# Patient Record
Sex: FEMALE | URBAN - NONMETROPOLITAN AREA
[De-identification: names, ages, dates, MRNs, and addresses within clinical notes are randomized per-mention and may not be internally consistent; named-entity substitution may affect disease eponyms.]

---

## 2020-08-31 ENCOUNTER — WEB ENCOUNTER (OUTPATIENT)
Dept: URBAN - NONMETROPOLITAN AREA CLINIC 2 | Facility: CLINIC | Age: 35
End: 2020-08-31

## 2020-09-01 ENCOUNTER — OFFICE VISIT (OUTPATIENT)
Dept: URBAN - NONMETROPOLITAN AREA CLINIC 13 | Facility: CLINIC | Age: 35
End: 2020-09-01
Payer: COMMERCIAL

## 2020-09-01 DIAGNOSIS — B19.10 HEPATITIS B: ICD-10-CM

## 2020-09-01 PROCEDURE — 99244 OFF/OP CNSLTJ NEW/EST MOD 40: CPT | Performed by: INTERNAL MEDICINE

## 2020-09-01 PROCEDURE — 99204 OFFICE O/P NEW MOD 45 MIN: CPT | Performed by: INTERNAL MEDICINE

## 2020-09-01 NOTE — HPI-TODAY'S VISIT:
Ms. Zurita is a new patient here for hepatitis B.  She is referred by her OB/GYN Dr. Ugarte.  She is 17 weeks pregnant.  She states that she did not receive this from her mother.  It was not vertical transmission.  She received it as a child and is always had it.  She is always had high levels.  Labs today show hepatitis B E antigen positive hepatitis B E antibody negative and hepatitis B surface antigen positive.  Her viral load is long 8.  She denies any other symptoms.  She does not think she is ever been treated.

## 2020-09-16 ENCOUNTER — TELEPHONE ENCOUNTER (OUTPATIENT)
Dept: URBAN - METROPOLITAN AREA CLINIC 92 | Facility: CLINIC | Age: 35
End: 2020-09-16

## 2020-09-22 ENCOUNTER — TELEPHONE ENCOUNTER (OUTPATIENT)
Dept: URBAN - METROPOLITAN AREA CLINIC 92 | Facility: CLINIC | Age: 35
End: 2020-09-22

## 2020-09-29 ENCOUNTER — OFFICE VISIT (OUTPATIENT)
Dept: URBAN - NONMETROPOLITAN AREA CLINIC 13 | Facility: CLINIC | Age: 35
End: 2020-09-29
Payer: COMMERCIAL

## 2020-09-29 DIAGNOSIS — B19.10 HEPATITIS B: ICD-10-CM

## 2020-09-29 DIAGNOSIS — R76.8 HEPATITIS B ANTIBODY POSITIVE: ICD-10-CM

## 2020-09-29 PROCEDURE — 99214 OFFICE O/P EST MOD 30 MIN: CPT | Performed by: INTERNAL MEDICINE

## 2020-09-29 NOTE — HPI-TODAY'S VISIT:
Ms. Zurita is a new patient here for hepatitis B.  She is referred by her OB/GYN Dr. Ugarte.  She is 17 weeks pregnant.  She states that she did not receive this from her mother.  It was not vertical transmission.  She received it as a child and is always had it.  She is always had high levels.  Labs today show hepatitis B E antigen positive hepatitis B E antibody negative and hepatitis B surface antigen positive.  Her viral load is long 8.  She denies any other symptoms.  She does not think she is ever been treated. Patient returns today for follow-up. She is doing well. Pregnancy seems to be going well. Labs showed normal liver tests but log eight hepatitis B levels. Ultrasound is normal. She has no questions or concerns today.

## 2020-09-29 NOTE — PHYSICAL EXAM GASTROINTESTINAL
Abdomen , soft, nontender, nondistended , no guarding or rigidity , no masses palpable , normal bowel sounds , Liver and Spleen , no hepatomegaly present  , liver nontender ,

## 2020-09-29 NOTE — PHYSICAL EXAM SKIN:
no rashes , no suspicious lesions , no areas of discoloration , no jaundice present , good turgor , no masses ,

## 2020-09-29 NOTE — PHYSICAL EXAM CONSTITUTIONAL:
well developed, well nourished , in no acute distress , ambulating without difficulty, normal communication ability

## 2020-10-14 ENCOUNTER — TELEPHONE ENCOUNTER (OUTPATIENT)
Dept: URBAN - NONMETROPOLITAN AREA CLINIC 13 | Facility: CLINIC | Age: 35
End: 2020-10-14

## 2020-11-18 ENCOUNTER — WEB ENCOUNTER (OUTPATIENT)
Dept: URBAN - NONMETROPOLITAN AREA CLINIC 13 | Facility: CLINIC | Age: 35
End: 2020-11-18

## 2020-11-18 RX ORDER — TENOFOVIR ALAFENAMIDE 25 MG/1
1 TABLET WITH FOOD TABLET ORAL ONCE A DAY
Qty: 90 | Refills: 3 | OUTPATIENT
Start: 2020-11-19 | End: 2021-11-14

## 2020-12-15 ENCOUNTER — OFFICE VISIT (OUTPATIENT)
Dept: URBAN - NONMETROPOLITAN AREA CLINIC 13 | Facility: CLINIC | Age: 35
End: 2020-12-15

## 2020-12-15 RX ORDER — TENOFOVIR ALAFENAMIDE 25 MG/1
1 TABLET WITH FOOD TABLET ORAL ONCE A DAY
Qty: 90 | Refills: 3 | Status: ACTIVE | COMMUNITY
Start: 2020-11-19 | End: 2021-11-14

## 2021-01-05 ENCOUNTER — OFFICE VISIT (OUTPATIENT)
Dept: URBAN - NONMETROPOLITAN AREA CLINIC 13 | Facility: CLINIC | Age: 36
End: 2021-01-05
Payer: COMMERCIAL

## 2021-01-05 DIAGNOSIS — R76.8 HEPATITIS B ANTIBODY POSITIVE: ICD-10-CM

## 2021-01-05 DIAGNOSIS — B19.10 HEPATITIS B: ICD-10-CM

## 2021-01-05 PROCEDURE — G8420 CALC BMI NORM PARAMETERS: HCPCS | Performed by: INTERNAL MEDICINE

## 2021-01-05 PROCEDURE — G8482 FLU IMMUNIZE ORDER/ADMIN: HCPCS | Performed by: INTERNAL MEDICINE

## 2021-01-05 PROCEDURE — 99214 OFFICE O/P EST MOD 30 MIN: CPT | Performed by: INTERNAL MEDICINE

## 2021-01-05 PROCEDURE — 1036F TOBACCO NON-USER: CPT | Performed by: INTERNAL MEDICINE

## 2021-01-05 RX ORDER — TENOFOVIR ALAFENAMIDE 25 MG/1
1 TABLET WITH FOOD TABLET ORAL ONCE A DAY
Qty: 90 | Refills: 3 | Status: ACTIVE | COMMUNITY
Start: 2020-11-19 | End: 2021-11-14

## 2021-01-05 NOTE — HPI-TODAY'S VISIT:
Ms. Zurita is a new patient here for hepatitis B.  She is referred by her OB/GYN Dr. Ugarte.  She is 17 weeks pregnant.  She states that she did not receive this from her mother.  It was not vertical transmission.  She received it as a child and is always had it.  She is always had high levels.  Labs today show hepatitis B E antigen positive hepatitis B E antibody negative and hepatitis B surface antigen positive.  Her viral load is long 8.  She denies any other symptoms.  She does not think she is ever been treated. Patient returns today for follow-up. She is doing well. Pregnancy seems to be going well. Labs showed normal liver tests but log eight hepatitis B levels. Ultrasound is normal. She has no questions or concerns today. Mrs Hernandez returns for follow-up for hepatitis B in pregnancy.  She had a very difficult time with her insurance company approving Vemlidy ultimately they did approve tenofovir year and she is currently on that.  Unfortunately she started that at 32 weeks.  She seems to be tolerating it well.  She is currently 4 weeks until her due date.  She has not had any side effects of the medication.

## 2021-03-02 ENCOUNTER — OFFICE VISIT (OUTPATIENT)
Dept: URBAN - NONMETROPOLITAN AREA CLINIC 13 | Facility: CLINIC | Age: 36
End: 2021-03-02

## 2021-04-27 ENCOUNTER — OFFICE VISIT (OUTPATIENT)
Dept: URBAN - NONMETROPOLITAN AREA CLINIC 13 | Facility: CLINIC | Age: 36
End: 2021-04-27

## 2021-05-11 ENCOUNTER — OFFICE VISIT (OUTPATIENT)
Dept: URBAN - NONMETROPOLITAN AREA CLINIC 13 | Facility: CLINIC | Age: 36
End: 2021-05-11
Payer: COMMERCIAL

## 2021-05-11 DIAGNOSIS — B19.10 VIRAL HEPATITIS B WITH HEPATITIS DELTA, WITHOUT HEPATIC COMA, UNSPECIFIED CHRONICITY: ICD-10-CM

## 2021-05-11 PROCEDURE — 99214 OFFICE O/P EST MOD 30 MIN: CPT | Performed by: INTERNAL MEDICINE

## 2021-05-11 RX ORDER — TENOFOVIR ALAFENAMIDE 25 MG/1
1 TABLET WITH FOOD TABLET ORAL ONCE A DAY
Qty: 90 | Refills: 3 | Status: ACTIVE | COMMUNITY
Start: 2020-11-19 | End: 2021-11-14

## 2021-05-11 NOTE — HPI-TODAY'S VISIT:
Ms. Zurita is a new patient here for hepatitis B.  She is referred by her OB/GYN Dr. Ugarte.  She is 17 weeks pregnant.  She states that she did not receive this from her mother.  It was not vertical transmission.  She received it as a child and is always had it.  She is always had high levels.  Labs today show hepatitis B E antigen positive hepatitis B E antibody negative and hepatitis B surface antigen positive.  Her viral load is long 8.  She denies any other symptoms.  She does not think she is ever been treated. Patient returns today for follow-up. She is doing well. Pregnancy seems to be going well. Labs showed normal liver tests but log eight hepatitis B levels. Ultrasound is normal. She has no questions or concerns today. Mrs Hernandez returns for follow-up for hepatitis B in pregnancy.  She had a very difficult time with her insurance company approving Vemlidy ultimately they did approve tenofovir year and she is currently on that.  Unfortunately she started that at 32 weeks.  She seems to be tolerating it well.  She is currently 4 weeks until her due date.  She has not had any side effects of the medication.  5/11/2021 Ms. Hernandez returns for follow-up for hepatitis B.  I had been seeing her for a few years with regards to her hepatitis B.  She had active replication and elevated viral load.  I spoke with 2 specific hepatologists in the [place] area regarding her case.  They both agreed for long-term therapy with Vemlidy/tenofovir year.  She then became pregnant it was recommended that she continue medication.  She did continue her medication to her pregnancy but I have not seen her since September.  Since that time it appears that she has decided to come off the medication without discussing this with our office.  She comes today, having read information on the Internet that she needs labs every 6 months and ultrasound imaging regularly.  I explained her that this is what we have been doing routinely given the concern that I have for long-term complications of hepatitis B.  She seems to be very hesitant to restart medication.  She states that she read on the Internet that most people in [place] have hepatitis B and do fine with it.  She denies any jaundice, itching, pruritus, or abdominal pain.

## 2021-05-19 ENCOUNTER — TELEPHONE ENCOUNTER (OUTPATIENT)
Dept: URBAN - NONMETROPOLITAN AREA CLINIC 2 | Facility: CLINIC | Age: 36
End: 2021-05-19

## 2021-06-09 ENCOUNTER — LAB OUTSIDE AN ENCOUNTER (OUTPATIENT)
Dept: URBAN - NONMETROPOLITAN AREA CLINIC 2 | Facility: CLINIC | Age: 36
End: 2021-06-09

## 2021-06-15 LAB
COMMENT: (no result)
HEP B CORE AB, IGM: (no result)
HEP BE AB: (no result)
HEP BE AG: REACTIVE
HEPATITIS B SURFACE AB IMMUNITY, QN: <5
HEPATITIS B SURFACE ANTIBODY QL: (no result)
HEPATITIS B SURFACE ANTIGEN: REACTIVE
QUESTION/PROBLEM:: (no result)
SPECIMEN(S) SUBMITTED:: (no result)
UNCLEAR ORDER:: (no result)

## 2021-06-17 ENCOUNTER — TELEPHONE ENCOUNTER (OUTPATIENT)
Dept: URBAN - METROPOLITAN AREA SURGERY CENTER 30 | Facility: SURGERY CENTER | Age: 36
End: 2021-06-17

## 2021-06-18 LAB
HBSAG SCREEN: POSITIVE
HBV IU/ML: (no result)
HBV IU/ML: (no result)
HEP B CORE AB, IGM: NEGATIVE
HEP B SURFACE AB, QUAL: NON REACTIVE
HEP BE AB: NEGATIVE
HEP BE AG: POSITIVE
LOG10 HBV IU/ML: (no result)
LOG10 HBV IU/ML: 8.64
TEST INFORMATION:: (no result)

## 2021-08-05 ENCOUNTER — TELEPHONE ENCOUNTER (OUTPATIENT)
Dept: URBAN - METROPOLITAN AREA CLINIC 23 | Facility: CLINIC | Age: 36
End: 2021-08-05

## 2021-11-09 ENCOUNTER — OFFICE VISIT (OUTPATIENT)
Dept: URBAN - NONMETROPOLITAN AREA CLINIC 13 | Facility: CLINIC | Age: 36
End: 2021-11-09
Payer: COMMERCIAL

## 2021-11-09 ENCOUNTER — WEB ENCOUNTER (OUTPATIENT)
Dept: URBAN - NONMETROPOLITAN AREA CLINIC 13 | Facility: CLINIC | Age: 36
End: 2021-11-09

## 2021-11-09 DIAGNOSIS — B19.10 HEPATITIS B: ICD-10-CM

## 2021-11-09 PROCEDURE — 99214 OFFICE O/P EST MOD 30 MIN: CPT | Performed by: INTERNAL MEDICINE

## 2021-11-09 RX ORDER — TENOFOVIR ALAFENAMIDE 25 MG/1
1 TABLET WITH FOOD TABLET ORAL ONCE A DAY
Qty: 90 | Refills: 3 | Status: ACTIVE | COMMUNITY
Start: 2020-11-19 | End: 2021-11-14

## 2021-11-15 ENCOUNTER — LAB OUTSIDE AN ENCOUNTER (OUTPATIENT)
Dept: URBAN - NONMETROPOLITAN AREA CLINIC 2 | Facility: CLINIC | Age: 36
End: 2021-11-15

## 2021-11-15 ENCOUNTER — TELEPHONE ENCOUNTER (OUTPATIENT)
Dept: URBAN - NONMETROPOLITAN AREA CLINIC 2 | Facility: CLINIC | Age: 36
End: 2021-11-15

## 2022-03-24 ENCOUNTER — TELEPHONE ENCOUNTER (OUTPATIENT)
Dept: URBAN - NONMETROPOLITAN AREA CLINIC 13 | Facility: CLINIC | Age: 37
End: 2022-03-24

## 2022-05-12 ENCOUNTER — OFFICE VISIT (OUTPATIENT)
Dept: URBAN - NONMETROPOLITAN AREA CLINIC 13 | Facility: CLINIC | Age: 37
End: 2022-05-12

## 2022-07-08 ENCOUNTER — OFFICE VISIT (OUTPATIENT)
Dept: URBAN - NONMETROPOLITAN AREA CLINIC 13 | Facility: CLINIC | Age: 37
End: 2022-07-08
Payer: COMMERCIAL

## 2022-07-08 ENCOUNTER — LAB OUTSIDE AN ENCOUNTER (OUTPATIENT)
Dept: URBAN - NONMETROPOLITAN AREA CLINIC 13 | Facility: CLINIC | Age: 37
End: 2022-07-08

## 2022-07-08 VITALS
HEART RATE: 79 BPM | TEMPERATURE: 97.6 F | BODY MASS INDEX: 26.74 KG/M2 | SYSTOLIC BLOOD PRESSURE: 104 MMHG | DIASTOLIC BLOOD PRESSURE: 73 MMHG | WEIGHT: 150.9 LBS | HEIGHT: 63 IN

## 2022-07-08 DIAGNOSIS — B19.10 HEPATITIS B: ICD-10-CM

## 2022-07-08 PROCEDURE — 99214 OFFICE O/P EST MOD 30 MIN: CPT | Performed by: NURSE PRACTITIONER

## 2022-07-08 NOTE — HPI-TODAY'S VISIT:
7/8/2022 Ms. Hernandez returns for follow-up for hepatitis B. She had active replication and elevated viral load. It was determined long-term therapy with Vemlidy/tenofovir was the best treatment.  Her last labs showed very mildly elevated transaminases but continued hepatitis B E antibody negativity and hepatitis B E antigen positivity. Her labs from November did not get a viral load. She has been complaint with her medication and takes it daily. She denies any SE.  She denies any jaundice, itching, pruritus, or abdominal pain.  Her daughter is now 18 months. She plans on having more children. CS

## 2022-07-08 NOTE — HPI-OTHER HISTORIES
Ms. Zurita is a new patient here for hepatitis B.  She is referred by her OB/GYN Dr. Ugarte.  She is 17 weeks pregnant.  She states that she did not receive this from her mother.  It was not vertical transmission.  She received it as a child and is always had it.  She is always had high levels.  Labs today show hepatitis B E antigen positive hepatitis B E antibody negative and hepatitis B surface antigen positive.  Her viral load is long 8.  She denies any other symptoms.  She does not think she is ever been treated. Patient returns today for follow-up. She is doing well. Pregnancy seems to be going well. Labs showed normal liver tests but log eight hepatitis B levels. Ultrasound is normal. She has no questions or concerns today. Mrs Hernandez returns for follow-up for hepatitis B in pregnancy.  She had a very difficult time with her insurance company approving Vemlidy ultimately they did approve tenofovir year and she is currently on that.  Unfortunately she started that at 32 weeks.  She seems to be tolerating it well.  She is currently 4 weeks until her due date.  She has not had any side effects of the medication.  5/11/2021 Ms. Hernandez returns for follow-up for hepatitis B.  I had been seeing her for a few years with regards to her hepatitis B.  She had active replication and elevated viral load.  I spoke with 2 specific hepatologists in the [place] area regarding her case.  They both agreed for long-term therapy with Vemlidy/tenofovir year.  She then became pregnant it was recommended that she continue medication.  She did continue her medication to her pregnancy but I have not seen her since September.  Since that time it appears that she has decided to come off the medication without discussing this with our office.  She comes today, having read information on the Internet that she needs labs every 6 months and ultrasound imaging regularly.  I explained her that this is what we have been doing routinely given the concern that I have for long-term complications of hepatitis B.  She seems to be very hesitant to restart medication.  She states that she read on the Internet that most people in [place] have hepatitis B and do fine with it.  She denies any jaundice, itching, pruritus, or abdominal pain. 11/9/2021 Doing well today.  She remains on tenofovir year with no side effects.  Most recent labs December showed very mildly elevated transaminases but continued hepatitis B E antibody negativity and hepatitis B E antigen positivity.  Hepatitis B: Continued

## 2022-07-11 ENCOUNTER — WEB ENCOUNTER (OUTPATIENT)
Dept: URBAN - NONMETROPOLITAN AREA CLINIC 13 | Facility: CLINIC | Age: 37
End: 2022-07-11

## 2022-07-15 ENCOUNTER — WEB ENCOUNTER (OUTPATIENT)
Dept: URBAN - NONMETROPOLITAN AREA CLINIC 13 | Facility: CLINIC | Age: 37
End: 2022-07-15

## 2022-07-22 ENCOUNTER — WEB ENCOUNTER (OUTPATIENT)
Dept: URBAN - NONMETROPOLITAN AREA CLINIC 13 | Facility: CLINIC | Age: 37
End: 2022-07-22

## 2022-07-25 ENCOUNTER — WEB ENCOUNTER (OUTPATIENT)
Dept: URBAN - NONMETROPOLITAN AREA CLINIC 13 | Facility: CLINIC | Age: 37
End: 2022-07-25

## 2022-07-26 ENCOUNTER — WEB ENCOUNTER (OUTPATIENT)
Dept: URBAN - NONMETROPOLITAN AREA CLINIC 13 | Facility: CLINIC | Age: 37
End: 2022-07-26

## 2022-07-26 PROBLEM — 111891008: Status: ACTIVE | Noted: 2021-05-11

## 2022-07-27 ENCOUNTER — WEB ENCOUNTER (OUTPATIENT)
Dept: URBAN - NONMETROPOLITAN AREA CLINIC 13 | Facility: CLINIC | Age: 37
End: 2022-07-27

## 2022-08-01 LAB
HEP B CORE AB, TOT: REACTIVE
HEPATITIS B SURFACE ANTIBODY QL: (no result)
HEPATITIS B VIRUS DNA: 2.56
HEPATITIS B VIRUS DNA: 367

## 2022-08-03 ENCOUNTER — WEB ENCOUNTER (OUTPATIENT)
Dept: URBAN - NONMETROPOLITAN AREA CLINIC 13 | Facility: CLINIC | Age: 37
End: 2022-08-03

## 2022-08-04 ENCOUNTER — TELEPHONE ENCOUNTER (OUTPATIENT)
Dept: URBAN - METROPOLITAN AREA CLINIC 92 | Facility: CLINIC | Age: 37
End: 2022-08-04

## 2022-08-05 ENCOUNTER — WEB ENCOUNTER (OUTPATIENT)
Dept: URBAN - NONMETROPOLITAN AREA CLINIC 13 | Facility: CLINIC | Age: 37
End: 2022-08-05

## 2022-08-18 ENCOUNTER — OFFICE VISIT (OUTPATIENT)
Dept: URBAN - NONMETROPOLITAN AREA CLINIC 13 | Facility: CLINIC | Age: 37
End: 2022-08-18

## 2022-11-13 ENCOUNTER — WEB ENCOUNTER (OUTPATIENT)
Dept: URBAN - NONMETROPOLITAN AREA CLINIC 13 | Facility: CLINIC | Age: 37
End: 2022-11-13

## 2022-12-27 ENCOUNTER — WEB ENCOUNTER (OUTPATIENT)
Dept: URBAN - NONMETROPOLITAN AREA CLINIC 13 | Facility: CLINIC | Age: 37
End: 2022-12-27

## 2023-01-08 ENCOUNTER — LAB OUTSIDE AN ENCOUNTER (OUTPATIENT)
Dept: URBAN - NONMETROPOLITAN AREA CLINIC 13 | Facility: CLINIC | Age: 38
End: 2023-01-08

## 2023-01-12 LAB
AFP, SERUM, TUMOR MARKER: 2.8
ALBUMIN/GLOBULIN RATIO: 1.7
ALBUMIN: 4
ALKALINE PHOSPHATASE: 63
ALT (SGPT): 16
AST (SGOT): 17
BILIRUBIN, DIRECT: 0.1
BILIRUBIN, INDIRECT: 0.3
BILIRUBIN, TOTAL: 0.4
BUN/CREATININE RATIO: 23
CALCIUM: 9.1
CARBON DIOXIDE: 24
CHLORIDE: 106
CREATININE: 0.48
EGFR: 127
GLOBULIN: 2.4
GLUCOSE: 93
HEMATOCRIT: 36.7
HEMOGLOBIN: 12.3
HEP B CORE AB, TOT: REACTIVE
HEPATITIS B SURFACE ANTIBODY QL: (no result)
HEPATITIS B VIRUS DNA: 2.39
HEPATITIS B VIRUS DNA: 245
MCH: 30.9
MCHC: 33.5
MCV: 92.2
MPV: 10.7
PLATELET COUNT: 223
POTASSIUM: 3.9
PROTEIN, TOTAL: 6.4
RDW: 12
RED BLOOD CELL COUNT: 3.98
SODIUM: 137
UREA NITROGEN (BUN): 11
WHITE BLOOD CELL COUNT: 6.1

## 2023-01-13 ENCOUNTER — OFFICE VISIT (OUTPATIENT)
Dept: URBAN - NONMETROPOLITAN AREA CLINIC 13 | Facility: CLINIC | Age: 38
End: 2023-01-13
Payer: COMMERCIAL

## 2023-01-13 VITALS
WEIGHT: 146 LBS | HEIGHT: 63 IN | BODY MASS INDEX: 25.87 KG/M2 | SYSTOLIC BLOOD PRESSURE: 107 MMHG | HEART RATE: 88 BPM | DIASTOLIC BLOOD PRESSURE: 74 MMHG

## 2023-01-13 DIAGNOSIS — Z3A.08 8 WEEKS GESTATION OF PREGNANCY: ICD-10-CM

## 2023-01-13 DIAGNOSIS — B19.10 HEPATITIS B: ICD-10-CM

## 2023-01-13 PROCEDURE — 99214 OFFICE O/P EST MOD 30 MIN: CPT | Performed by: NURSE PRACTITIONER

## 2023-01-13 NOTE — HPI-TODAY'S VISIT:
1/13/2023 Ms. Hernandez returns for follow-up for hepatitis B. She had active replication and elevated viral load. It was determined long-term therapy with Vemlidy/tenofovir was the best treatment.  Her last labs show normal transaminases. Hepatitis B E antibody negativity and hepatitis B E antigen positivity. Her viral load is 245- down from 360. She has been complaint with tenofovir and takes it daily. She denies any SE.  She denies any jaundice, itching, pruritus, or abdominal pain.  She is 7-8 weeks pregnant. CS

## 2023-01-13 NOTE — HPI-OTHER HISTORIES
Ms. Zurita is a new patient here for hepatitis B.  She is referred by her OB/GYN Dr. Ugarte.  She is 17 weeks pregnant.  She states that she did not receive this from her mother.  It was not vertical transmission.  She received it as a child and is always had it.  She is always had high levels.  Labs today show hepatitis B E antigen positive hepatitis B E antibody negative and hepatitis B surface antigen positive.  Her viral load is long 8.  She denies any other symptoms.  She does not think she is ever been treated. Patient returns today for follow-up. She is doing well. Pregnancy seems to be going well. Labs showed normal liver tests but log eight hepatitis B levels. Ultrasound is normal. She has no questions or concerns today. Mrs Hernandez returns for follow-up for hepatitis B in pregnancy.  She had a very difficult time with her insurance company approving Vemlidy ultimately they did approve tenofovir year and she is currently on that.  Unfortunately she started that at 32 weeks.  She seems to be tolerating it well.  She is currently 4 weeks until her due date.  She has not had any side effects of the medication.  5/11/2021 Ms. Hernandez returns for follow-up for hepatitis B.  I had been seeing her for a few years with regards to her hepatitis B.  She had active replication and elevated viral load.  I spoke with 2 specific hepatologists in the [place] area regarding her case.  They both agreed for long-term therapy with Vemlidy/tenofovir year.  She then became pregnant it was recommended that she continue medication.  She did continue her medication to her pregnancy but I have not seen her since September.  Since that time it appears that she has decided to come off the medication without discussing this with our office.  She comes today, having read information on the Internet that she needs labs every 6 months and ultrasound imaging regularly.  I explained her that this is what we have been doing routinely given the concern that I have for long-term complications of hepatitis B.  She seems to be very hesitant to restart medication.  She states that she read on the Internet that most people in [place] have hepatitis B and do fine with it.  She denies any jaundice, itching, pruritus, or abdominal pain. 11/9/2021 Doing well today.  She remains on tenofovir year with no side effects.  Most recent labs December showed very mildly elevated transaminases but continued hepatitis B E antibody negativity and hepatitis B E antigen positivity.  Hepatitis B: Continued  7/8/2022 Ms. Hernandez returns for follow-up for hepatitis B. She had active replication and elevated viral load. It was determined long-term therapy with Vemlidy/tenofovir was the best treatment.  Her last labs showed very mildly elevated transaminases but continued hepatitis B E antibody negativity and hepatitis B E antigen positivity. Her labs from November did not get a viral load. She has been complaint with her medication and takes it daily. She denies any SE.  She denies any jaundice, itching, pruritus, or abdominal pain.  Her daughter is now 18 months. She plans on having more children. CS

## 2023-01-20 ENCOUNTER — TELEPHONE ENCOUNTER (OUTPATIENT)
Dept: URBAN - NONMETROPOLITAN AREA CLINIC 13 | Facility: CLINIC | Age: 38
End: 2023-01-20

## 2023-02-14 ENCOUNTER — WEB ENCOUNTER (OUTPATIENT)
Dept: URBAN - NONMETROPOLITAN AREA CLINIC 13 | Facility: CLINIC | Age: 38
End: 2023-02-14

## 2023-04-13 ENCOUNTER — LAB OUTSIDE AN ENCOUNTER (OUTPATIENT)
Dept: URBAN - NONMETROPOLITAN AREA CLINIC 13 | Facility: CLINIC | Age: 38
End: 2023-04-13

## 2023-04-29 LAB
ALBUMIN/GLOBULIN RATIO: 1.3
ALBUMIN: 3.5
ALKALINE PHOSPHATASE: 51
ALT (SGPT): 15
AST (SGOT): 16
BCP MUTATIONS: DETECTED
BILIRUBIN, DIRECT: 0.1
BILIRUBIN, INDIRECT: 0.2
BILIRUBIN, TOTAL: 0.3
BUN/CREATININE RATIO: 23
CALCIUM: 8.6
CARBON DIOXIDE: 22
CHLORIDE: 106
CREATININE: 0.39
EGFR: 132
GLOBULIN: 2.6
GLUCOSE: 66
HBV GENOTYPE:: (no result)
HEMATOCRIT: 34.6
HEMOGLOBIN: 11.4
HEP B CORE AB, IGM: (no result)
HEP BE AB: (no result)
HEP BE AG: REACTIVE
HEPATITIS B SURFACE AB IMMUNITY, QN: <5
HEPATITIS B SURFACE ANTIGEN: REACTIVE
HEPATITIS B VIRUS DNA: 1340
HEPATITIS B VIRUS DNA: 3.13
MCH: 31.8
MCHC: 32.9
MCV: 96.4
MPV: 10.2
PLATELET COUNT: 253
POLYMERASE MUTATIONS:: NOT DETECTED
POTASSIUM: 4
PRECORE MUTATION: NOT DETECTED
PROTEIN, TOTAL: 6.1
RDW: 12.2
RED BLOOD CELL COUNT: 3.59
SODIUM: 137
UREA NITROGEN (BUN): 9
WHITE BLOOD CELL COUNT: 7.1

## 2023-07-13 ENCOUNTER — LAB OUTSIDE AN ENCOUNTER (OUTPATIENT)
Dept: URBAN - NONMETROPOLITAN AREA CLINIC 13 | Facility: CLINIC | Age: 38
End: 2023-07-13

## 2023-07-14 ENCOUNTER — OFFICE VISIT (OUTPATIENT)
Dept: URBAN - NONMETROPOLITAN AREA CLINIC 13 | Facility: CLINIC | Age: 38
End: 2023-07-14
Payer: COMMERCIAL

## 2023-07-14 VITALS
SYSTOLIC BLOOD PRESSURE: 108 MMHG | HEIGHT: 63 IN | TEMPERATURE: 98.1 F | BODY MASS INDEX: 30.3 KG/M2 | WEIGHT: 171 LBS | DIASTOLIC BLOOD PRESSURE: 72 MMHG | HEART RATE: 80 BPM

## 2023-07-14 DIAGNOSIS — B19.10 HEPATITIS B: ICD-10-CM

## 2023-07-14 PROCEDURE — 99214 OFFICE O/P EST MOD 30 MIN: CPT | Performed by: NURSE PRACTITIONER

## 2023-07-14 NOTE — HPI-OTHER HISTORIES
Ms. Zurita is a new patient here for hepatitis B.  She is referred by her OB/GYN Dr. Ugarte.  She is 17 weeks pregnant.  She states that she did not receive this from her mother.  It was not vertical transmission.  She received it as a child and is always had it.  She is always had high levels.  Labs today show hepatitis B E antigen positive hepatitis B E antibody negative and hepatitis B surface antigen positive.  Her viral load is long 8.  She denies any other symptoms.  She does not think she is ever been treated. Patient returns today for follow-up. She is doing well. Pregnancy seems to be going well. Labs showed normal liver tests but log eight hepatitis B levels. Ultrasound is normal. She has no questions or concerns today. Mrs Hernandez returns for follow-up for hepatitis B in pregnancy.  She had a very difficult time with her insurance company approving Vemlidy ultimately they did approve tenofovir year and she is currently on that.  Unfortunately she started that at 32 weeks.  She seems to be tolerating it well.  She is currently 4 weeks until her due date.  She has not had any side effects of the medication.  5/11/2021 Ms. Hernandez returns for follow-up for hepatitis B.  I had been seeing her for a few years with regards to her hepatitis B.  She had active replication and elevated viral load.  I spoke with 2 specific hepatologists in the [place] area regarding her case.  They both agreed for long-term therapy with Vemlidy/tenofovir year.  She then became pregnant it was recommended that she continue medication.  She did continue her medication to her pregnancy but I have not seen her since September.  Since that time it appears that she has decided to come off the medication without discussing this with our office.  She comes today, having read information on the Internet that she needs labs every 6 months and ultrasound imaging regularly.  I explained her that this is what we have been doing routinely given the concern that I have for long-term complications of hepatitis B.  She seems to be very hesitant to restart medication.  She states that she read on the Internet that most people in [place] have hepatitis B and do fine with it.  She denies any jaundice, itching, pruritus, or abdominal pain. 11/9/2021 Doing well today.  She remains on tenofovir year with no side effects.  Most recent labs December showed very mildly elevated transaminases but continued hepatitis B E antibody negativity and hepatitis B E antigen positivity.  Hepatitis B: Continued  7/8/2022 Ms. Hernandez returns for follow-up for hepatitis B. She had active replication and elevated viral load. It was determined long-term therapy with Vemlidy/tenofovir was the best treatment.  Her last labs showed very mildly elevated transaminases but continued hepatitis B E antibody negativity and hepatitis B E antigen positivity. Her labs from November did not get a viral load. She has been complaint with her medication and takes it daily. She denies any SE.  She denies any jaundice, itching, pruritus, or abdominal pain.  Her daughter is now 18 months. She plans on having more children. CS  1/13/2023 Ms. Hernandez returns for follow-up for hepatitis B. She had active replication and elevated viral load. It was determined long-term therapy with Vemlidy/tenofovir was the best treatment.  Her last labs show normal transaminases. Hepatitis B E antibody negativity and hepatitis B E antigen positivity. Her viral load is 245- down from 360. She has been complaint with tenofovir and takes it daily. She denies any SE.  She denies any jaundice, itching, pruritus, or abdominal pain.  She is 7-8 weeks pregnant. CS

## 2023-07-14 NOTE — PHYSICAL EXAM CHEST:
breathing is unlabored without accessory muscle use, normal breath sounds [FreeTextEntry1] :  - Continue with all current treatments.\par  - Labs done in office\par - Further management pending lab results\par - Ambulatory BP monitoring

## 2023-07-14 NOTE — HPI-TODAY'S VISIT:
7/14/2023 Ms. Hernandez returns for follow-up for hepatitis B. She had active replication and elevated viral load. It was determined long-term therapy with Vemlidy/tenofovir was the best treatment.  Her last labs were done through her OB and her log was 2.13. Her log in April was 3.13. She is taking tenofovir daily. She had not had any issues getting her medication and her PCP ordered it last time. She denies any SE.  She denies any jaundice, itching, pruritus, or abdominal pain.  She is 8 months pregnant- she is due August 26th. She is having a boy. CS

## 2023-11-07 ENCOUNTER — WEB ENCOUNTER (OUTPATIENT)
Dept: URBAN - NONMETROPOLITAN AREA CLINIC 13 | Facility: CLINIC | Age: 38
End: 2023-11-07

## 2023-11-08 ENCOUNTER — WEB ENCOUNTER (OUTPATIENT)
Dept: URBAN - NONMETROPOLITAN AREA CLINIC 13 | Facility: CLINIC | Age: 38
End: 2023-11-08

## 2023-11-29 ENCOUNTER — WEB ENCOUNTER (OUTPATIENT)
Dept: URBAN - NONMETROPOLITAN AREA CLINIC 13 | Facility: CLINIC | Age: 38
End: 2023-11-29

## 2023-12-11 LAB
AFP, SERUM, TUMOR MARKER: 2.1
ALBUMIN/GLOBULIN RATIO: 1.5
ALBUMIN: 4.3
ALKALINE PHOSPHATASE: 50
ALT (SGPT): 30
AST (SGOT): 25
BILIRUBIN, DIRECT: 0.1
BILIRUBIN, INDIRECT: 0.2
BILIRUBIN, TOTAL: 0.3
BUN/CREATININE RATIO: (no result)
CALCIUM: 9
CARBON DIOXIDE: 23
CHLORIDE: 105
CREATININE: 0.59
EGFR: 120
GLOBULIN: 2.9
GLUCOSE: 85
HEMATOCRIT: 41.2
HEMOGLOBIN: 13.3
HEP B CORE AB, IGM: (no result)
HEP BE AB: (no result)
HEP BE AG: REACTIVE
HEPATITIS B SURFACE AB IMMUNITY, QN: <5
HEPATITIS B SURFACE ANTIGEN: REACTIVE
HEPATITIS B VIRUS DNA: 1.6
HEPATITIS B VIRUS DNA: 40
INR: 1
MCH: 31.3
MCHC: 32.3
MCV: 96.9
MPV: 10.4
PLATELET COUNT: 282
POTASSIUM: 4.1
PROTEIN, TOTAL: 7.2
PT: 10.3
RDW: 12.1
RED BLOOD CELL COUNT: 4.25
SODIUM: 138
UREA NITROGEN (BUN): 10
WHITE BLOOD CELL COUNT: 5.2

## 2023-12-13 ENCOUNTER — DASHBOARD ENCOUNTERS (OUTPATIENT)
Age: 38
End: 2023-12-13

## 2023-12-13 ENCOUNTER — OFFICE VISIT (OUTPATIENT)
Dept: URBAN - NONMETROPOLITAN AREA CLINIC 13 | Facility: CLINIC | Age: 38
End: 2023-12-13
Payer: COMMERCIAL

## 2023-12-13 VITALS
BODY MASS INDEX: 28.63 KG/M2 | SYSTOLIC BLOOD PRESSURE: 115 MMHG | HEIGHT: 63 IN | WEIGHT: 161.6 LBS | DIASTOLIC BLOOD PRESSURE: 78 MMHG | HEART RATE: 87 BPM

## 2023-12-13 DIAGNOSIS — B19.10 HEPATITIS B: ICD-10-CM

## 2023-12-13 PROBLEM — 66071002 HEPATITIS B: Status: ACTIVE | Noted: 2020-09-29

## 2023-12-13 PROCEDURE — 99214 OFFICE O/P EST MOD 30 MIN: CPT | Performed by: NURSE PRACTITIONER

## 2023-12-13 NOTE — PHYSICAL EXAM GASTROINTESTINAL
Abdomen , soft, nontender, nondistended , no guarding or rigidity , no masses palpable , normal bowel sounds , Liver and Spleen , no hepatosplenomegaly , liver nontender , spleen not palpable negative...

## 2023-12-13 NOTE — HPI-OTHER HISTORIES
Ms. Zurita is a new patient here for hepatitis B.  She is referred by her OB/GYN Dr. Ugarte.  She is 17 weeks pregnant.  She states that she did not receive this from her mother.  It was not vertical transmission.  She received it as a child and is always had it.  She is always had high levels.  Labs today show hepatitis B E antigen positive hepatitis B E antibody negative and hepatitis B surface antigen positive.  Her viral load is long 8.  She denies any other symptoms.  She does not think she is ever been treated. Patient returns today for follow-up. She is doing well. Pregnancy seems to be going well. Labs showed normal liver tests but log eight hepatitis B levels. Ultrasound is normal. She has no questions or concerns today. Mrs Hernandez returns for follow-up for hepatitis B in pregnancy.  She had a very difficult time with her insurance company approving Vemlidy ultimately they did approve tenofovir year and she is currently on that.  Unfortunately she started that at 32 weeks.  She seems to be tolerating it well.  She is currently 4 weeks until her due date.  She has not had any side effects of the medication.  5/11/2021 Ms. Hernandez returns for follow-up for hepatitis B.  I had been seeing her for a few years with regards to her hepatitis B.  She had active replication and elevated viral load.  I spoke with 2 specific hepatologists in the [place] area regarding her case.  They both agreed for long-term therapy with Vemlidy/tenofovir year.  She then became pregnant it was recommended that she continue medication.  She did continue her medication to her pregnancy but I have not seen her since September.  Since that time it appears that she has decided to come off the medication without discussing this with our office.  She comes today, having read information on the Internet that she needs labs every 6 months and ultrasound imaging regularly.  I explained her that this is what we have been doing routinely given the concern that I have for long-term complications of hepatitis B.  She seems to be very hesitant to restart medication.  She states that she read on the Internet that most people in [place] have hepatitis B and do fine with it.  She denies any jaundice, itching, pruritus, or abdominal pain. 11/9/2021 Doing well today.  She remains on tenofovir year with no side effects.  Most recent labs December showed very mildly elevated transaminases but continued hepatitis B E antibody negativity and hepatitis B E antigen positivity.  Hepatitis B: Continued  7/8/2022 Ms. Hernandez returns for follow-up for hepatitis B. She had active replication and elevated viral load. It was determined long-term therapy with Vemlidy/tenofovir was the best treatment.  Her last labs showed very mildly elevated transaminases but continued hepatitis B E antibody negativity and hepatitis B E antigen positivity. Her labs from November did not get a viral load. She has been complaint with her medication and takes it daily. She denies any SE.  She denies any jaundice, itching, pruritus, or abdominal pain.  Her daughter is now 18 months. She plans on having more children. CS  1/13/2023 Ms. Hernandez returns for follow-up for hepatitis B. She had active replication and elevated viral load. It was determined long-term therapy with Vemlidy/tenofovir was the best treatment.  Her last labs show normal transaminases. Hepatitis B E antibody negativity and hepatitis B E antigen positivity. Her viral load is 245- down from 360. She has been complaint with tenofovir and takes it daily. She denies any SE.  She denies any jaundice, itching, pruritus, or abdominal pain.  She is 7-8 weeks pregnant. CS  7/14/2023 Ms. Hernandez returns for follow-up for hepatitis B. She had active replication and elevated viral load. It was determined long-term therapy with Vemlidy/tenofovir was the best treatment. Her last labs were done through her OB and her log was 2.13. Her log in April was 3.13. She is taking tenofovir daily. She had not had any issues getting her medication and her PCP ordered it last time. She denies any SE. She denies any jaundice, itching, pruritus, or abdominal pain. She is 8 months pregnant- she is due August 26th. She is having a boy. CS

## 2023-12-13 NOTE — HPI-TODAY'S VISIT:
12/13/2023 Ms. Hernandez returns for follow-up for hepatitis B. She had active replication and elevated viral load. It was determined long-term therapy with Vemlidy/tenofovir was the best treatment.  Her last labs showed a very low viral load and normal liver enzymes. She is taking tenofovir daily. She denies any SE.  She denies any jaundice, itching, pruritus, or abdominal pain.  She delivered her baby boy in August. CS

## 2024-01-10 ENCOUNTER — TELEPHONE ENCOUNTER (OUTPATIENT)
Dept: URBAN - NONMETROPOLITAN AREA CLINIC 2 | Facility: CLINIC | Age: 39
End: 2024-01-10

## 2024-01-14 ENCOUNTER — LAB OUTSIDE AN ENCOUNTER (OUTPATIENT)
Dept: URBAN - NONMETROPOLITAN AREA CLINIC 13 | Facility: CLINIC | Age: 39
End: 2024-01-14

## 2024-01-18 ENCOUNTER — OFFICE VISIT (OUTPATIENT)
Dept: URBAN - NONMETROPOLITAN AREA CLINIC 13 | Facility: CLINIC | Age: 39
End: 2024-01-18

## 2024-06-13 ENCOUNTER — LAB OUTSIDE AN ENCOUNTER (OUTPATIENT)
Dept: URBAN - NONMETROPOLITAN AREA CLINIC 13 | Facility: CLINIC | Age: 39
End: 2024-06-13

## 2024-06-13 ENCOUNTER — OFFICE VISIT (OUTPATIENT)
Dept: URBAN - NONMETROPOLITAN AREA CLINIC 13 | Facility: CLINIC | Age: 39
End: 2024-06-13

## 2024-06-24 ENCOUNTER — WEB ENCOUNTER (OUTPATIENT)
Dept: URBAN - NONMETROPOLITAN AREA CLINIC 13 | Facility: CLINIC | Age: 39
End: 2024-06-24

## 2024-06-25 ENCOUNTER — WEB ENCOUNTER (OUTPATIENT)
Dept: URBAN - NONMETROPOLITAN AREA CLINIC 13 | Facility: CLINIC | Age: 39
End: 2024-06-25

## 2024-06-27 ENCOUNTER — TELEPHONE ENCOUNTER (OUTPATIENT)
Dept: URBAN - NONMETROPOLITAN AREA CLINIC 2 | Facility: CLINIC | Age: 39
End: 2024-06-27

## 2024-12-13 ENCOUNTER — TELEPHONE ENCOUNTER (OUTPATIENT)
Dept: URBAN - NONMETROPOLITAN AREA CLINIC 2 | Facility: CLINIC | Age: 39
End: 2024-12-13

## 2024-12-25 ENCOUNTER — LAB OUTSIDE AN ENCOUNTER (OUTPATIENT)
Dept: URBAN - NONMETROPOLITAN AREA CLINIC 13 | Facility: CLINIC | Age: 39
End: 2024-12-25

## 2025-04-30 ENCOUNTER — WEB ENCOUNTER (OUTPATIENT)
Dept: URBAN - NONMETROPOLITAN AREA CLINIC 2 | Facility: CLINIC | Age: 40
End: 2025-04-30